# Patient Record
Sex: FEMALE | Race: WHITE
[De-identification: names, ages, dates, MRNs, and addresses within clinical notes are randomized per-mention and may not be internally consistent; named-entity substitution may affect disease eponyms.]

---

## 2020-09-10 PROBLEM — Z00.00 ENCOUNTER FOR PREVENTIVE HEALTH EXAMINATION: Status: ACTIVE | Noted: 2020-09-10

## 2020-09-16 PROBLEM — N95.0 POST-MENOPAUSAL BLEEDING: Status: ACTIVE | Noted: 2020-09-16

## 2020-09-17 ENCOUNTER — APPOINTMENT (OUTPATIENT)
Dept: GYNECOLOGIC ONCOLOGY | Facility: CLINIC | Age: 45
End: 2020-09-17
Payer: MEDICAID

## 2020-09-17 DIAGNOSIS — Z80.3 FAMILY HISTORY OF MALIGNANT NEOPLASM OF BREAST: ICD-10-CM

## 2020-09-17 DIAGNOSIS — N95.0 POSTMENOPAUSAL BLEEDING: ICD-10-CM

## 2020-09-17 DIAGNOSIS — Z78.9 OTHER SPECIFIED HEALTH STATUS: ICD-10-CM

## 2020-09-17 DIAGNOSIS — Z83.3 FAMILY HISTORY OF DIABETES MELLITUS: ICD-10-CM

## 2020-09-17 DIAGNOSIS — Z85.71 PERSONAL HISTORY OF HODGKIN LYMPHOMA: ICD-10-CM

## 2020-09-17 PROCEDURE — 99204 OFFICE O/P NEW MOD 45 MIN: CPT | Mod: 25

## 2020-09-17 PROCEDURE — 76857 US EXAM PELVIC LIMITED: CPT | Mod: 59

## 2020-09-17 PROCEDURE — 93976 VASCULAR STUDY: CPT | Mod: 59

## 2020-09-17 PROCEDURE — 76830 TRANSVAGINAL US NON-OB: CPT | Mod: 59

## 2020-09-17 RX ORDER — MEDROXYPROGESTERONE ACETATE 10 MG/1
10 TABLET ORAL DAILY
Qty: 10 | Refills: 6 | Status: ACTIVE | COMMUNITY
Start: 2020-09-17 | End: 1900-01-01

## 2020-09-22 NOTE — PHYSICAL EXAM
[Normal] : Bimanual Exam: Normal [de-identified] : Patient was interviewed and examined with chaperone present. Name of Chaperone: Cydney Jean PA-C

## 2020-09-22 NOTE — CHIEF COMPLAINT
[FreeTextEntry1] : [facility] Office\par \par [facility] [phone] at [address] [facility] [address]

## 2020-09-22 NOTE — ASSESSMENT
[FreeTextEntry1] : 44 y/o nulligravida female with a history of Hodgkins lymphoma presenting with PMB, with EMB revealing hyperplasia without atypia followed by a negative EMB on D&C. Her physical examination and US were both normal today in the office. I discussed uterine lining on US imaging today < 4mm, and rationale between her chance of malignancy < 1 %. I also discussed that she is at an increased risk to developing an endometrial cancer due to recent weight gain as well as being nulligravida and the rationale between these factors and increased estrogen. I also discussed slightly increased risk of breast ca with her history of radiation, and stressed importance of screening mammograms. \par \par I recommended treatment with Cyclic progesterone and how it acts as a chemical D&C, and will clean out her lining. Recommended taking every 2-3 months. \par

## 2020-09-22 NOTE — END OF VISIT
[FreeTextEntry3] : This note accurately reflects the work and decisions made by me.\par Written by Cydney Jean PA-C acting as a scribe for Dr. Sidra Francois.

## 2020-09-22 NOTE — PAST MEDICAL HISTORY
[Postmenopausal] : The patient is postmenopausal [Menarche Age ____] : age at menarche was [unfilled] [Regular Cycle Intervals] : have been regular [FreeTextEntry4] : In highschool, became sporadic. Heavy cycles

## 2020-09-22 NOTE — HISTORY OF PRESENT ILLNESS
[FreeTextEntry1] : 44 y/o nulligravida female, LMP 6/2018 being referred by Dr. Parra for evaluation of PMB. 4/23/20 US revealed no endometrial masses. Uterus measures 88 x 46 x 49mm. Fibroid 37 x 25 x 23. Left ovarian clear cyst 25 x 19 x 19mm. She then underwent EMB which revealed focal endometrial hyperplasia without atypia (benign hyperplasia) in the background of disordered proliferative endometrium. She then underwent D&C on 6/29/20 to confirm pathology, which it did not. ECC revealed squamous epithelial atypia in settign of acute inflammation, favoring reactive. EMC revealed proliferative endometrium, benign endo polyp. Seperate fragment of atypical squamous epithelium, favoring reactive. Patient then had another episode of bleeding in 8/2020. Patient reports since 10/2019, she has gained 60 pounds. Denies changes in her bowel and bladder habits and pelvic pains.\par \par Of note patient with history of Hodgkins lymphoma at age 28 s/o chemotherapy and radiation to her upper chest. \par \par She had recent laboratory work done on 8/19/20:\par TSH, T4 - normal\par Estradiol 33 - follicular phase \par FSH 42 - post menopausal \par Prolactin 4.4 - post menopausal \par DHEA elevated at 242\par Testosterone and free testosterone normal \par \par Lpap - 4/9/20; negative \par LMammogram - 4/2018; normal as per patient  \par \par